# Patient Record
Sex: FEMALE | URBAN - METROPOLITAN AREA
[De-identification: names, ages, dates, MRNs, and addresses within clinical notes are randomized per-mention and may not be internally consistent; named-entity substitution may affect disease eponyms.]

---

## 2019-02-18 ENCOUNTER — OFFICE VISIT (OUTPATIENT)
Dept: URBAN - METROPOLITAN AREA CLINIC 18 | Facility: CLINIC | Age: 34
End: 2019-02-18

## 2019-02-18 DIAGNOSIS — H52.223 REGULAR ASTIGMATISM, BILATERAL: Primary | ICD-10-CM

## 2019-02-18 DIAGNOSIS — D31.40 BENIGN NEOPLASM OF UNSPECIFIED CILIARY BODY: ICD-10-CM

## 2019-02-18 PROCEDURE — 92004 COMPRE OPH EXAM NEW PT 1/>: CPT | Performed by: OPTOMETRIST

## 2019-02-18 ASSESSMENT — INTRAOCULAR PRESSURE
OD: 15
OS: 18

## 2019-02-18 ASSESSMENT — VISUAL ACUITY
OD: 20/20
OS: 20/20

## 2019-02-18 ASSESSMENT — KERATOMETRY
OS: 41.75
OD: 41.63

## 2019-02-18 NOTE — IMPRESSION/PLAN
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 Elevated iris nevus Plan: Discussed diagnosis in detail with patient. Will need further testing. Recommend Retina consult with Dr. Hortencia Samano or Dr. Kriss Deluca for B scan, DE and possible Iris photos.

## 2019-04-30 ENCOUNTER — OFFICE VISIT (OUTPATIENT)
Dept: URBAN - METROPOLITAN AREA CLINIC 17 | Facility: CLINIC | Age: 34
End: 2019-04-30
Payer: COMMERCIAL

## 2019-04-30 PROCEDURE — 92134 CPTRZ OPH DX IMG PST SGM RTA: CPT | Performed by: OPHTHALMOLOGY

## 2019-04-30 PROCEDURE — 99202 OFFICE O/P NEW SF 15 MIN: CPT | Performed by: OPHTHALMOLOGY

## 2019-04-30 PROCEDURE — 99213 OFFICE O/P EST LOW 20 MIN: CPT | Performed by: OPHTHALMOLOGY

## 2019-04-30 NOTE — IMPRESSION/PLAN
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 OU. Plan: Flat pigmented iris nevus OU (0.5mm in size). Benign. Obtain photos today for baseline results. Will cont to observe, no treatment req'd. F/u 1 yr DFE/OCT or sooner PRN.